# Patient Record
Sex: FEMALE | Race: WHITE | Employment: UNEMPLOYED | RURAL
[De-identification: names, ages, dates, MRNs, and addresses within clinical notes are randomized per-mention and may not be internally consistent; named-entity substitution may affect disease eponyms.]

---

## 2024-07-09 ENCOUNTER — HOSPITAL ENCOUNTER (EMERGENCY)
Facility: HOSPITAL | Age: 19
Discharge: HOME | End: 2024-07-09
Attending: HOSPITALIST
Payer: COMMERCIAL

## 2024-07-09 ENCOUNTER — APPOINTMENT (OUTPATIENT)
Dept: CT IMAGING | Facility: HOSPITAL | Age: 19
End: 2024-07-09
Attending: HOSPITALIST
Payer: COMMERCIAL

## 2024-07-09 VITALS
SYSTOLIC BLOOD PRESSURE: 133 MMHG | DIASTOLIC BLOOD PRESSURE: 76 MMHG | RESPIRATION RATE: 16 BRPM | OXYGEN SATURATION: 99 % | HEART RATE: 104 BPM
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
## 2024-07-09 DIAGNOSIS — S06.0X1A CONCUSSION WITH LOSS OF CONSCIOUSNESS OF 30 MINUTES OR LESS, INITIAL ENCOUNTER: ICD-10-CM

## 2024-07-09 DIAGNOSIS — S00.83XA FACIAL CONTUSION, INITIAL ENCOUNTER: ICD-10-CM

## 2024-07-09 DIAGNOSIS — S09.90XA CLOSED HEAD INJURY, INITIAL ENCOUNTER: ICD-10-CM

## 2024-07-09 PROCEDURE — 70450 CT HEAD/BRAIN W/O DYE: CPT

## 2024-07-09 PROCEDURE — 70486 CT MAXILLOFACIAL W/O DYE: CPT

## 2024-07-09 PROCEDURE — 6370000000 HC RX 637 (ALT 250 FOR IP): Performed by: HOSPITALIST

## 2024-07-09 PROCEDURE — 99284 EMERGENCY DEPT VISIT MOD MDM: CPT

## 2024-07-09 RX ORDER — IBUPROFEN 800 MG/1
800 TABLET ORAL 4 TIMES DAILY PRN
Qty: 30 TABLET | Refills: 0 | Status: SHIPPED | OUTPATIENT
Start: 2024-07-09

## 2024-07-09 RX ORDER — IBUPROFEN 800 MG/1
800 TABLET ORAL ONCE
Status: COMPLETED | OUTPATIENT
Start: 2024-07-09 | End: 2024-07-09

## 2024-07-09 RX ORDER — FAMOTIDINE 40 MG/1
TABLET, FILM COATED ORAL
COMMUNITY

## 2024-07-09 RX ADMIN — IBUPROFEN 800 MG: 800 TABLET, FILM COATED ORAL at 16:32

## 2024-07-09 ASSESSMENT — PAIN SCALES - GENERAL
PAINLEVEL_OUTOF10: 8
PAINLEVEL_OUTOF10: 4

## 2024-07-09 ASSESSMENT — PAIN DESCRIPTION - DESCRIPTORS: DESCRIPTORS: ACHING

## 2024-07-09 ASSESSMENT — PAIN DESCRIPTION - LOCATION
LOCATION: HEAD
LOCATION: HEAD

## 2024-07-09 ASSESSMENT — PAIN - FUNCTIONAL ASSESSMENT: PAIN_FUNCTIONAL_ASSESSMENT: 0-10
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
## 2024-07-30 ENCOUNTER — HOSPITAL ENCOUNTER (EMERGENCY)
Facility: HOSPITAL | Age: 19
Discharge: HOME | End: 2024-07-30
Attending: FAMILY MEDICINE
Payer: COMMERCIAL

## 2024-07-30 VITALS
HEIGHT: 65 IN | RESPIRATION RATE: 16 BRPM | SYSTOLIC BLOOD PRESSURE: 126 MMHG | OXYGEN SATURATION: 100 % | DIASTOLIC BLOOD PRESSURE: 84 MMHG | BODY MASS INDEX: 35.82 KG/M2 | TEMPERATURE: 98.2 F | WEIGHT: 215 LBS | HEART RATE: 68 BPM

## 2024-07-30 DIAGNOSIS — Z71.1 CONCERN ABOUT STD IN FEMALE WITHOUT DIAGNOSIS: ICD-10-CM

## 2024-07-30 DIAGNOSIS — J06.9 VIRAL URI WITH COUGH: Primary | ICD-10-CM

## 2024-07-30 LAB
AMPHET UR QL SCN: NORMAL
BACTERIA URNS QL MICRO: ABNORMAL /HPF
BARBITURATES UR QL SCN: NORMAL
BENZODIAZ UR QL SCN: NORMAL
BILIRUB UR QL STRIP.AUTO: NEGATIVE
BUPRENORPHINE QUAL, URINE: NORMAL
CANNABINOIDS UR QL SCN: NORMAL
CLARITY UR: CLEAR
COCAINE UR QL SCN: NORMAL
COLOR UR: YELLOW
DRUG SCREEN COMMENT UR-IMP: NORMAL
EPI CELLS #/AREA URNS HPF: ABNORMAL /HPF (ref 0–5)
FENTANYL SCREEN, URINE: NORMAL
FLUAV AG NPH QL: NEGATIVE
FLUBV AG NPH QL: NEGATIVE
GLUCOSE UR STRIP.AUTO-MCNC: NEGATIVE MG/DL
HCG UR QL: NEGATIVE
HGB UR QL STRIP.AUTO: NEGATIVE
KETONES UR STRIP.AUTO-MCNC: NEGATIVE MG/DL
LEUKOCYTE ESTERASE UR QL STRIP.AUTO: ABNORMAL
METHADONE UR QL SCN: NORMAL
METHAMPHET UR QL SCN: NORMAL
NITRITE UR QL STRIP.AUTO: NEGATIVE
OPIATES UR QL SCN: NORMAL
OXYCODONE UR QL SCN: NORMAL
PCP UR QL SCN: NORMAL
PH UR STRIP.AUTO: 7 [PH] (ref 5–8)
PROT UR STRIP.AUTO-MCNC: NEGATIVE MG/DL
RBC #/AREA URNS HPF: ABNORMAL /HPF (ref 0–4)
S PYO AG THROAT QL: NEGATIVE
SARS-COV-2 RDRP RESP QL NAA+PROBE: NOT DETECTED
SP GR UR STRIP.AUTO: 1.01 (ref 1–1.03)
TRICYCLICS UR QL SCN: NORMAL
UA COMPLETE W REFLEX CULTURE PNL UR: ABNORMAL
UA DIPSTICK W REFLEX MICRO PNL UR: YES
URN SPEC COLLECT METH UR: ABNORMAL
UROBILINOGEN UR STRIP-ACNC: 0.2 E.U./DL
WBC #/AREA URNS HPF: ABNORMAL /HPF (ref 0–5)

## 2024-07-30 PROCEDURE — 80307 DRUG TEST PRSMV CHEM ANLYZR: CPT

## 2024-07-30 PROCEDURE — 81001 URINALYSIS AUTO W/SCOPE: CPT

## 2024-07-30 PROCEDURE — 6370000000 HC RX 637 (ALT 250 FOR IP): Performed by: FAMILY MEDICINE

## 2024-07-30 PROCEDURE — 87804 INFLUENZA ASSAY W/OPTIC: CPT

## 2024-07-30 PROCEDURE — 87880 STREP A ASSAY W/OPTIC: CPT

## 2024-07-30 PROCEDURE — 84703 CHORIONIC GONADOTROPIN ASSAY: CPT

## 2024-07-30 PROCEDURE — 6360000002 HC RX W HCPCS: Performed by: FAMILY MEDICINE

## 2024-07-30 PROCEDURE — 87591 N.GONORRHOEAE DNA AMP PROB: CPT

## 2024-07-30 PROCEDURE — 96372 THER/PROPH/DIAG INJ SC/IM: CPT

## 2024-07-30 PROCEDURE — G0480 DRUG TEST DEF 1-7 CLASSES: HCPCS

## 2024-07-30 PROCEDURE — 2500000003 HC RX 250 WO HCPCS: Performed by: FAMILY MEDICINE

## 2024-07-30 PROCEDURE — 87491 CHLMYD TRACH DNA AMP PROBE: CPT

## 2024-07-30 PROCEDURE — 99284 EMERGENCY DEPT VISIT MOD MDM: CPT

## 2024-07-30 PROCEDURE — 87635 SARS-COV-2 COVID-19 AMP PRB: CPT

## 2024-07-30 RX ORDER — ACETAMINOPHEN 500 MG
1000 TABLET ORAL
Status: COMPLETED | OUTPATIENT
Start: 2024-07-30 | End: 2024-07-30

## 2024-07-30 RX ORDER — AZITHROMYCIN 250 MG/1
1000 TABLET, FILM COATED ORAL ONCE
Status: COMPLETED | OUTPATIENT
Start: 2024-07-30 | End: 2024-07-30

## 2024-07-30 RX ORDER — LIDOCAINE HYDROCHLORIDE 10 MG/ML
INJECTION, SOLUTION INFILTRATION; PERINEURAL
Status: DISCONTINUED
Start: 2024-07-30 | End: 2024-07-30 | Stop reason: HOSPADM

## 2024-07-30 RX ORDER — CEFTRIAXONE 1 G/1
INJECTION, POWDER, FOR SOLUTION INTRAMUSCULAR; INTRAVENOUS
Status: DISCONTINUED
Start: 2024-07-30 | End: 2024-07-30 | Stop reason: HOSPADM

## 2024-07-30 RX ORDER — BENZONATATE 100 MG/1
100 CAPSULE ORAL 3 TIMES DAILY PRN
Qty: 10 CAPSULE | Refills: 0 | Status: SHIPPED | OUTPATIENT
Start: 2024-07-30 | End: 2024-08-06

## 2024-07-30 RX ORDER — GUAIFENESIN 200 MG/10ML
200 LIQUID ORAL ONCE
Status: COMPLETED | OUTPATIENT
Start: 2024-07-30 | End: 2024-07-30

## 2024-07-30 RX ADMIN — LIDOCAINE HYDROCHLORIDE 1000 MG: 10 INJECTION, SOLUTION INFILTRATION; PERINEURAL at 15:48

## 2024-07-30 RX ADMIN — ACETAMINOPHEN 1000 MG: 500 TABLET ORAL at 15:30

## 2024-07-30 RX ADMIN — AZITHROMYCIN DIHYDRATE 1000 MG: 250 TABLET ORAL at 15:29

## 2024-07-30 RX ADMIN — GUAIFENESIN 200 MG: 200 SOLUTION ORAL at 15:30

## 2024-07-30 ASSESSMENT — LIFESTYLE VARIABLES
HOW MANY STANDARD DRINKS CONTAINING ALCOHOL DO YOU HAVE ON A TYPICAL DAY: PATIENT DOES NOT DRINK
HOW OFTEN DO YOU HAVE A DRINK CONTAINING ALCOHOL: NEVER

## 2024-07-30 NOTE — ED PROVIDER NOTES
dictation.    EKG:     RADIOLOGY:   Non-plain film images such as CT, Ultrasound and MRI are read by the radiologist. Plain radiographic images are visualized and preliminarily interpreted by the ED Provider with the below findings:        Interpretation per the Radiologist below, if available at the time of this note:    No orders to display     No results found.    No results found.    PROCEDURES   Unless otherwise noted below, none     Procedures    CRITICAL CARE TIME       EMERGENCY DEPARTMENT COURSE and DIFFERENTIAL DIAGNOSIS/MDM:   Vitals:    Vitals:    07/30/24 1457   BP: 126/84   Pulse: 68   Resp: 16   Temp: 98.2 °F (36.8 °C)   TempSrc: Oral   SpO2: 100%   Weight: 97.5 kg (215 lb)   Height: 1.651 m (5' 5\")       Patient was given the following medications:  Medications   cefTRIAXone (ROCEPHIN) 1 g injection (has no administration in time range)   lidocaine 1 % injection (has no administration in time range)   cefTRIAXone (ROCEPHIN) 1,000 mg in lidocaine 1 % 2.86 mL IM Injection (1,000 mg IntraMUSCular Given 7/30/24 1548)   azithromycin (ZITHROMAX) tablet 1,000 mg (1,000 mg Oral Given 7/30/24 1529)   guaiFENesin (ROBITUSSIN) 100 MG/5ML liquid 200 mg (200 mg Oral Given 7/30/24 1530)   acetaminophen (TYLENOL) tablet 1,000 mg (1,000 mg Oral Given 7/30/24 1530)             Is this patient to be included in the SEP-1 Core Measure due to severe sepsis or septic shock?   No   Exclusion criteria - the patient is NOT to be included for SEP-1 Core Measure due to:  Viral etiology found or highly suspected (including COVID-19) without concomitant bacterial infection    PAST MEDICAL HISTORY      has a past medical history of GERD (gastroesophageal reflux disease).     CC/HPI Summary, DDx, ED Course, and Reassessment: Differential diagnosis: Viral URI, bronchitis, COVID-19, influenza, streptococcal pharyngitis, normal physiological discharge, yeast infection, STI, UTI    Patient negative for COVID-19, influenza type a and

## 2024-08-02 LAB
C TRACH DNA UR QL NAA+PROBE: NEGATIVE
N GONORRHOEA DNA UR QL NAA+PROBE: NEGATIVE

## 2025-02-28 ENCOUNTER — LAB (OUTPATIENT)
Age: 20
End: 2025-02-28
Payer: COMMERCIAL

## 2025-02-28 ENCOUNTER — OFFICE VISIT (OUTPATIENT)
Age: 20
End: 2025-02-28
Payer: COMMERCIAL

## 2025-02-28 VITALS
BODY MASS INDEX: 39.66 KG/M2 | DIASTOLIC BLOOD PRESSURE: 78 MMHG | OXYGEN SATURATION: 98 % | RESPIRATION RATE: 16 BRPM | HEART RATE: 83 BPM | SYSTOLIC BLOOD PRESSURE: 118 MMHG | HEIGHT: 65 IN | WEIGHT: 238.06 LBS

## 2025-02-28 DIAGNOSIS — Z00.00 ANNUAL PHYSICAL EXAM: Primary | ICD-10-CM

## 2025-02-28 DIAGNOSIS — Z11.3 ROUTINE SCREENING FOR STI (SEXUALLY TRANSMITTED INFECTION): ICD-10-CM

## 2025-02-28 DIAGNOSIS — K21.9 GASTROESOPHAGEAL REFLUX DISEASE, UNSPECIFIED WHETHER ESOPHAGITIS PRESENT: ICD-10-CM

## 2025-02-28 DIAGNOSIS — E66.9 OBESITY (BMI 35.0-39.9 WITHOUT COMORBIDITY): ICD-10-CM

## 2025-02-28 DIAGNOSIS — Z00.00 ANNUAL PHYSICAL EXAM: ICD-10-CM

## 2025-02-28 PROBLEM — K59.09 CONSTIPATION, CHRONIC: Status: ACTIVE | Noted: 2021-11-09

## 2025-02-28 PROBLEM — K52.9 CHRONIC DIARRHEA: Status: ACTIVE | Noted: 2021-11-09

## 2025-02-28 PROBLEM — J45.909 ASTHMA: Status: ACTIVE | Noted: 2021-10-06

## 2025-02-28 LAB
CHOLEST SERPL-MCNC: 168 MG/DL (ref 0–200)
HBA1C MFR BLD: 4.9 % (ref 4.8–5.6)
HCV AB SER QL: NORMAL
HDLC SERPL-MCNC: 55 MG/DL (ref 40–60)
HIV 1+2 AB+HIV1 P24 AG SERPL QL IA: NORMAL
LDLC SERPL CALC-MCNC: 102 MG/DL (ref 0–100)
LDLC/HDLC SERPL: 1.86 {RATIO}
TRIGL SERPL-MCNC: 53 MG/DL (ref 0–150)
TSH SERPL DL<=0.05 MIU/L-ACNC: 2.36 UIU/ML (ref 0.27–4.2)
VLDLC SERPL-MCNC: 11 MG/DL (ref 5–40)

## 2025-02-28 PROCEDURE — 83036 HEMOGLOBIN GLYCOSYLATED A1C: CPT | Performed by: NURSE PRACTITIONER

## 2025-02-28 PROCEDURE — 80061 LIPID PANEL: CPT | Performed by: NURSE PRACTITIONER

## 2025-02-28 PROCEDURE — 84443 ASSAY THYROID STIM HORMONE: CPT | Performed by: NURSE PRACTITIONER

## 2025-02-28 PROCEDURE — G0432 EIA HIV-1/HIV-2 SCREEN: HCPCS | Performed by: NURSE PRACTITIONER

## 2025-02-28 PROCEDURE — 86803 HEPATITIS C AB TEST: CPT | Performed by: NURSE PRACTITIONER

## 2025-02-28 PROCEDURE — 86592 SYPHILIS TEST NON-TREP QUAL: CPT | Performed by: NURSE PRACTITIONER

## 2025-02-28 RX ORDER — FAMOTIDINE 40 MG/1
40 TABLET, FILM COATED ORAL DAILY
Qty: 90 TABLET | Refills: 1 | Status: SHIPPED | OUTPATIENT
Start: 2025-02-28

## 2025-02-28 RX ORDER — FAMOTIDINE 40 MG/1
40 TABLET, FILM COATED ORAL DAILY
COMMUNITY
End: 2025-02-28 | Stop reason: SDUPTHER

## 2025-02-28 NOTE — PROGRESS NOTES
Chief Complaint  Establish Care and Annual Exam    Subjective          Katy Do presents to [facility] for   History of Present Illness  History of Present Illness  The patient presents for a physical exam.    She has been without a primary care physician for an extended period and is seeking to establish care. She was in the ER last month with nausea, and they did a bunch of labs there, which were normal. She has a history of receiving vaccinations in [place], including one dose of the HPV vaccine, but experienced an allergic reaction to an unspecified vaccine, which prevented her from receiving the second dose. She has previously received influenza vaccines but reports feeling unwell post-vaccination and declines an update today. She has never undergone a Pap smear but has an upcoming appointment with Dr. Elsi Suazo, OB-GYN. She expresses interest in STD testing, even though she has no specific concerns or history of outbreaks. She reports no history of drug use. She recently lost her glasses and is due for a dental visit. Her last dental visit was approximately one month ago. She reports no significant medical history, including as a baby.    She has a long-standing history of gastrointestinal issues, including constipation, diarrhea, abdominal pain, and nausea. These symptoms have been present since her youth and were investigated via upper endoscopy during her teenage years. She has been diagnosed with GERD and IBS, which present with alternating constipation and diarrhea. She takes famotidine as needed, which she finds beneficial. She was previously prescribed omeprazole but discontinued it due to forgetfulness. She notes that dietary improvements have significantly alleviated her symptoms.    She recently discontinued Depo-Provera after a three-year course, with her last injection administered at the beginning of the current month. She does not plan to resume any form  "of birth control.    SOCIAL HISTORY  She reports no history of drug use.    ALLERGIES  The patient has had an allergic reaction to an unspecified vaccine.    MEDICATIONS  Current: Famotidine.  Past: Omeprazole.    IMMUNIZATIONS  She has received one dose of the HPV vaccine. She has received influenza vaccines in the past but declines an update today.    Objective   Vital Signs:   Vitals:    02/28/25 0946   BP: 118/78   BP Location: Left arm   Patient Position: Sitting   Cuff Size: Adult   Pulse: 83   Resp: 16   SpO2: 98%   Weight: 108 kg (238 lb 1 oz)   Height: 166 cm (65.35\")     Body mass index is 39.19 kg/m².    Class 2 Severe Obesity (BMI >=35 and <=39.9). Obesity-related health conditions include the following: none. Obesity is unchanged. BMI is is above average; BMI management plan is completed. We discussed portion control and increasing exercise.    The following portions of the patient's history were reviewed and updated as appropriate: allergies, current medications, past family history, past medical history, past social history, past surgical history, and problem list.      Physical Exam  Vitals and nursing note reviewed.   Constitutional:       Appearance: Normal appearance.   HENT:      Right Ear: Tympanic membrane, ear canal and external ear normal.      Left Ear: Tympanic membrane, ear canal and external ear normal.      Nose: Nose normal.      Mouth/Throat:      Mouth: Mucous membranes are moist.      Pharynx: Oropharynx is clear.   Eyes:      Conjunctiva/sclera: Conjunctivae normal.      Pupils: Pupils are equal, round, and reactive to light.   Cardiovascular:      Rate and Rhythm: Normal rate and regular rhythm.      Heart sounds: Normal heart sounds.   Pulmonary:      Effort: Pulmonary effort is normal.      Breath sounds: Normal breath sounds.   Abdominal:      General: Bowel sounds are normal.      Palpations: Abdomen is soft.   Musculoskeletal:         General: Normal range of motion.      " Cervical back: Normal range of motion and neck supple.   Skin:     General: Skin is warm.   Neurological:      Mental Status: She is alert and oriented to person, place, and time.   Psychiatric:         Mood and Affect: Mood normal.         Behavior: Behavior normal.         Thought Content: Thought content normal.        Result Review :                Immunization History   Administered Date(s) Administered    DTaP, Unspecified 2004, 2004, 2004, 02/17/2006, 01/24/2008    Fluzone (or Fluarix & Flulaval for VFC) >6mos 10/16/2013, 11/02/2015, 10/15/2019, 03/18/2024    HPV Quadrivalent 03/19/2015    Hep B / HiB 02/17/2006    Hep B, Adolescent or Pediatric 2004, 2004, 2004    Hepatitis B Adult/Adolescent IM 02/07/2006    HiB 2004, 2004, 2004    Hib (PRP-OMP) 02/17/2006    IPV 2004, 2004, 2004, 01/24/2008    MMR 02/17/2006, 01/24/2008    Meningococcal Conjugate 03/19/2015    Tdap 03/19/2015    Varicella 01/12/2005, 01/24/2008     Health Maintenance   Topic Date Due    HPV VACCINES (2 - 2-dose series) 09/19/2015    Pneumococcal Vaccine 0-49 (1 of 2 - PCV) Never done    INFLUENZA VACCINE  07/01/2024    COVID-19 Vaccine (1 - 2024-25 season) Never done    HEPATITIS C SCREENING  Never done    ANNUAL PHYSICAL  Never done    PAP SMEAR  Never done    TDAP/TD VACCINES (2 - Td or Tdap) 03/19/2025    BMI FOLLOWUP  02/28/2026    MENINGOCOCCAL VACCINE  Aged Out        Assessment and Plan    Diagnoses and all orders for this visit:    1. Annual physical exam (Primary)  -     Lipid Panel; Future    2. Obesity (BMI 35.0-39.9 without comorbidity)  -     TSH Rfx On Abnormal To Free T4; Future  -     Hemoglobin A1c; Future    3. Routine screening for STI (sexually transmitted infection)  -     NuSwab VG+ - Swab, Vagina; Future  -     RPR Qualitative with Reflex to Quant; Future  -     HIV-1 / O / 2 Ag / Antibody; Future  -     Hepatitis C Antibody; Future  -     NuSwab  VG+ - Swab, Vagina    4. Gastroesophageal reflux disease, unspecified whether esophagitis present  -     famotidine (PEPCID) 40 MG tablet; Take 1 tablet by mouth Daily.  Dispense: 90 tablet; Refill: 1      Assessment & Plan  1. Health maintenance.  She was in the ER last month with nausea, and they did of labs there, which were normal. She is due for a Pap smear and has an appointment scheduled with Dr. Elsi Suazo, OB-GYN. She is also due for her influenza vaccine, which she declined today. She has received one dose of the HPV vaccine and needs two more doses. However, she had an allergic reaction to a previous vaccine and will need to confirm which vaccine it was before proceeding. She is up to date on her dental visits, with the last visit being about a month ago for a tooth removal. She needs new glasses as she recently lost her old ones. A cholesterol panel, thyroid screening, and diabetes screening will be conducted today. STD testing, including swabs for Chlamydia, gonorrhea, Trichomonas, bacterial vaginosis, and yeast, as well as blood tests for syphilis, hepatitis, and HIV, will be performed.    2. Gastroesophageal reflux disease.  She has been experiencing GERD symptoms and has been taking famotidine as needed. A prescription for famotidine, sufficient for a 3-month supply with one refill, will be sent.    3. Irritable bowel syndrome.  She reports varying symptoms of constipation and diarrhea associated with IBS.    Follow-up  The patient will follow up in 1 year, provided her lab results are within normal limits.    PROCEDURE  The patient underwent an upper endoscopy during her teenage years.    Counseling/anticipatory guidance: Nutrition, physical activity, healthy weight, dental health, mental health, eye exam, immunizations, screenings   - Reviewed vaccination records; pt will verify reaction history before receiving vaccines. Discussed routine labs. Pt has appt for pap.       Follow Up   Return in  about 1 year (around 2/28/2026) for Annual.  Patient was given instructions and counseling regarding her condition or for health maintenance advice. Please see specific information pulled into the AVS if appropriate.     Patient or patient representative verbalized consent for the use of Ambient Listening during the visit with  RANJAN Dinero for chart documentation. 2/28/2025  10:05 EST

## 2025-03-01 LAB — RPR SER QL: NORMAL

## 2025-03-03 DIAGNOSIS — B37.31 VAGINAL CANDIDA: Primary | ICD-10-CM

## 2025-03-03 LAB
A VAGINAE DNA VAG QL NAA+PROBE: ABNORMAL SCORE
BVAB2 DNA VAG QL NAA+PROBE: ABNORMAL SCORE
C ALBICANS DNA VAG QL NAA+PROBE: POSITIVE
C GLABRATA DNA VAG QL NAA+PROBE: NEGATIVE
C TRACH DNA SPEC QL NAA+PROBE: NEGATIVE
MEGA1 DNA VAG QL NAA+PROBE: ABNORMAL SCORE
N GONORRHOEA DNA VAG QL NAA+PROBE: NEGATIVE
T VAGINALIS DNA VAG QL NAA+PROBE: NEGATIVE

## 2025-03-03 RX ORDER — FLUCONAZOLE 150 MG/1
150 TABLET ORAL ONCE
Qty: 1 TABLET | Refills: 0 | Status: SHIPPED | OUTPATIENT
Start: 2025-03-03 | End: 2025-03-03

## 2025-05-07 ENCOUNTER — PATIENT MESSAGE (OUTPATIENT)
Age: 20
End: 2025-05-07
Payer: COMMERCIAL

## 2025-05-29 ENCOUNTER — TELEPHONE (OUTPATIENT)
Age: 20
End: 2025-05-29
Payer: COMMERCIAL

## 2025-07-02 ENCOUNTER — OFFICE VISIT (OUTPATIENT)
Age: 20
End: 2025-07-02
Payer: COMMERCIAL

## 2025-07-02 VITALS
SYSTOLIC BLOOD PRESSURE: 128 MMHG | WEIGHT: 250.3 LBS | HEIGHT: 65 IN | HEART RATE: 111 BPM | OXYGEN SATURATION: 98 % | BODY MASS INDEX: 41.7 KG/M2 | DIASTOLIC BLOOD PRESSURE: 88 MMHG

## 2025-07-02 DIAGNOSIS — F41.1 GAD (GENERALIZED ANXIETY DISORDER): Primary | ICD-10-CM

## 2025-07-02 DIAGNOSIS — R00.0 TACHYCARDIA: ICD-10-CM

## 2025-07-02 DIAGNOSIS — J30.2 SEASONAL ALLERGIC RHINITIS, UNSPECIFIED TRIGGER: ICD-10-CM

## 2025-07-02 DIAGNOSIS — J01.90 ACUTE NON-RECURRENT SINUSITIS, UNSPECIFIED LOCATION: ICD-10-CM

## 2025-07-02 PROCEDURE — 93000 ELECTROCARDIOGRAM COMPLETE: CPT | Performed by: NURSE PRACTITIONER

## 2025-07-02 PROCEDURE — 99214 OFFICE O/P EST MOD 30 MIN: CPT | Performed by: NURSE PRACTITIONER

## 2025-07-02 RX ORDER — ACETAMINOPHEN AND CODEINE PHOSPHATE 120; 12 MG/5ML; MG/5ML
0.35 SOLUTION ORAL DAILY
COMMUNITY
Start: 2025-04-10 | End: 2026-04-10

## 2025-07-02 RX ORDER — CETIRIZINE HYDROCHLORIDE 10 MG/1
10 TABLET ORAL DAILY
Qty: 90 TABLET | Refills: 3 | Status: SHIPPED | OUTPATIENT
Start: 2025-07-02

## 2025-07-02 RX ORDER — ESCITALOPRAM OXALATE 5 MG/1
5 TABLET ORAL DAILY
Qty: 30 TABLET | Refills: 0 | Status: SHIPPED | OUTPATIENT
Start: 2025-07-02

## 2025-07-02 NOTE — PROGRESS NOTES
Chief Complaint  ER Follow Up and Anxiety    Subjective        Katy Do presents to [facility]    frequent headaches and anxiety  Symptoms are: new.   Onset was in the past 7 days.   Symptoms occur: daily.  Symptoms include: abdominal pain, joint pain, nasal congestion, fatigue, headaches, myalgias, neck pain, numbness and vertigo.   Pertinent negative symptoms include no anorexia, no change in stool, no chest pain, no chills, no cough, no diaphoresis, no fever, no joint swelling, no nausea, no rash, no sore throat, no swollen glands, no dysuria, no visual change, no vomiting and no weakness.       History of Present Illness  The patient presents for evaluation of Bell's palsy, headaches, anxiety, elevated heart rate, sinus issues, and concerns about diabetes.    She was recently diagnosed with Bell's palsy in the emergency room and was prescribed steroids, which she believes have been beneficial. She completed her course of steroids two days ago and is not currently on any other medications.    Prior to the onset of Bell's palsy, she experienced daily headaches, described as a sensation of her head being on fire, particularly at the back. These headaches, which started last month, are accompanied by severe anxiety and shaking. She occasionally takes Tylenol or ibuprofen for severe pain. No imaging studies were performed during her ER visit.    She has a history of anxiety, for which she was previously on antidepressants during middle school, but has been off these medications for the past 6 to 7 years. She does not recall if the medications were effective in managing her anxiety. She reports that her anxiety levels have increased recently, coinciding with significant life changes.    She also reports occasional elevated heart rate and dizziness. She is not experiencing any chest pain but notes tenderness upon touch.    She has been dealing with sinus issues, which cause  "ear pain and drainage. The drainage was severe enough to reach her chest, causing her to cough it up. She did not take any medication for this symptom. She has not had any fevers.    Her sister is prediabetic and has advised her to get checked for diabetes. However, her A1c was 4.9 in February, which is within the normal range.    FAMILY HISTORY  The patient mentions that diabetes runs in the family.    Results  Labs   - A1c: 02/2025, 4.9%   - Blood sugar: Normal     Objective   Vital Signs:  /88 (BP Location: Right arm, Patient Position: Sitting, Cuff Size: Adult)   Pulse 111   Ht 166 cm (65.35\")   Wt 114 kg (250 lb 4.8 oz)   SpO2 98%   BMI 41.20 kg/m²   Estimated body mass index is 41.2 kg/m² as calculated from the following:    Height as of this encounter: 166 cm (65.35\").    Weight as of this encounter: 114 kg (250 lb 4.8 oz).    Physical Exam  Vitals reviewed.   Constitutional:       Appearance: Normal appearance.   HENT:      Nose: Nose normal.      Mouth/Throat:      Mouth: Mucous membranes are moist.      Pharynx: Oropharynx is clear.   Eyes:      Conjunctiva/sclera: Conjunctivae normal.   Cardiovascular:      Rate and Rhythm: Regular rhythm. Tachycardia present.      Heart sounds: Normal heart sounds. No murmur heard.  Pulmonary:      Effort: Pulmonary effort is normal.      Breath sounds: Normal breath sounds.   Musculoskeletal:         General: Normal range of motion.      Cervical back: Normal range of motion.   Skin:     General: Skin is warm.   Neurological:      Mental Status: She is alert and oriented to person, place, and time.   Psychiatric:         Mood and Affect: Mood normal.         Behavior: Behavior normal.         Thought Content: Thought content normal.        Result Review :           ECG 12 Lead    Date/Time: 7/2/2025 10:20 AM  Performed by: Darrick Gutierres MA    Authorized by: Lauryn Chavira APRN  Comparison: not compared with previous ECG   Previous ECG: no previous ECG " available  Rhythm: sinus rhythm  Rate: normal  QRS axis: normal    Clinical impression: normal ECG              Assessment and Plan   Diagnoses and all orders for this visit:    1. ARLET (generalized anxiety disorder) (Primary)  -     escitalopram (Lexapro) 5 MG tablet; Take 1 tablet by mouth Daily.  Dispense: 30 tablet; Refill: 0    2. Tachycardia  -     ECG 12 Lead    3. Acute non-recurrent sinusitis, unspecified location  -     amoxicillin-clavulanate (AUGMENTIN) 875-125 MG per tablet; Take 1 tablet by mouth 2 (Two) Times a Day for 10 days.  Dispense: 20 tablet; Refill: 0    4. Seasonal allergic rhinitis, unspecified trigger  -     cetirizine (zyrTEC) 10 MG tablet; Take 1 tablet by mouth Daily.  Dispense: 90 tablet; Refill: 3      Assessment & Plan  1. Bell's Palsy.  - Diagnosed in the ER and prescribed steroids, which have been effective.  - Provided with an after-visit summary containing information about Bell's Palsy and its management.    2. Headaches.  - Reports daily headaches for the past month, described as sharp pains at the back of her head, similar to migraines.  - Occasionally takes Tylenol and ibuprofen for severe pain.  - No imaging was done in the ER. An EKG will be performed to evaluate her elevated heart rate.    3. Anxiety.  - Reports a sudden increase in anxiety over the past month, coinciding with her headaches.  - Has a history of anxiety and was previously on antidepressants but has been off them for 6-7 years.  - Lexapro will be initiated at a low dose, with the option to increase the dosage as needed. Advised to take it daily, either in the morning or at night, depending on how it affects her. If it causes tiredness, she should take it at night.    4. Elevated heart rate.  - Elevated heart rate could be a side effect of the steroids taken for Bell's Palsy.  - An EKG will be performed to further investigate.    5. Sinus issues.  - Reports sinus pain and drainage, which may be contributing to  her headaches.  - Augmentin will be prescribed, to be taken twice daily for 10 days. Advised to take it with food and consider probiotics or yogurt to prevent diarrhea.    6. Prediabetes.  - A1c level was 4.9 in 02/2025, indicating good control of blood sugar levels.  - No immediate concern for diabetes.    Follow-up  - A follow-up visit is scheduled in 2 weeks.       The following portions of the patient's history were reviewed and updated as appropriate: allergies, current medications, past family history, past medical history, past social history, past surgical history, and problem list.       Follow Up   Return in about 2 weeks (around 7/16/2025) for Recheck; medication follow up..  Patient was given instructions and counseling regarding her condition or for health maintenance advice. Please see specific information pulled into the AVS if appropriate.         Patient or patient representative verbalized consent for the use of Ambient Listening during the visit with  RANJAN Dinero for chart documentation. 7/2/2025  10:07 EDT

## 2025-07-28 DIAGNOSIS — F41.1 GAD (GENERALIZED ANXIETY DISORDER): ICD-10-CM

## 2025-07-28 RX ORDER — ESCITALOPRAM OXALATE 5 MG/1
5 TABLET ORAL DAILY
Qty: 90 TABLET | Refills: 1 | Status: SHIPPED | OUTPATIENT
Start: 2025-07-28

## 2025-07-28 NOTE — TELEPHONE ENCOUNTER
Rx Refill Note  Requested Prescriptions     Pending Prescriptions Disp Refills    escitalopram (Lexapro) 5 MG tablet 90 tablet      Sig: Take 1 tablet by mouth Daily.      Last office visit with prescribing clinician: 7/2/2025   Last telemedicine visit with prescribing clinician: Visit date not found   Next office visit with prescribing clinician: 3/3/2026     Maritza Aguilar MA  07/28/25, 09:37 EDT    Rx sent